# Patient Record
Sex: MALE | Race: WHITE | Employment: FULL TIME | URBAN - METROPOLITAN AREA
[De-identification: names, ages, dates, MRNs, and addresses within clinical notes are randomized per-mention and may not be internally consistent; named-entity substitution may affect disease eponyms.]

---

## 2017-03-04 ENCOUNTER — OFFICE VISIT (OUTPATIENT)
Dept: FAMILY MEDICINE CLINIC | Facility: CLINIC | Age: 47
End: 2017-03-04

## 2017-03-04 VITALS
HEART RATE: 80 BPM | TEMPERATURE: 99 F | WEIGHT: 295 LBS | BODY MASS INDEX: 50.36 KG/M2 | OXYGEN SATURATION: 90 % | HEIGHT: 64.25 IN | SYSTOLIC BLOOD PRESSURE: 130 MMHG | RESPIRATION RATE: 16 BRPM | DIASTOLIC BLOOD PRESSURE: 70 MMHG

## 2017-03-04 DIAGNOSIS — J22 ACUTE LOWER RESPIRATORY TRACT INFECTION: Primary | ICD-10-CM

## 2017-03-04 DIAGNOSIS — J98.01 COUGH DUE TO BRONCHOSPASM: ICD-10-CM

## 2017-03-04 DIAGNOSIS — J11.1 INFLUENZA-LIKE ILLNESS: ICD-10-CM

## 2017-03-04 PROCEDURE — 94640 AIRWAY INHALATION TREATMENT: CPT | Performed by: NURSE PRACTITIONER

## 2017-03-04 PROCEDURE — 99203 OFFICE O/P NEW LOW 30 MIN: CPT | Performed by: NURSE PRACTITIONER

## 2017-03-04 RX ORDER — PREDNISONE 20 MG/1
TABLET ORAL
Qty: 10 TABLET | Refills: 0 | Status: SHIPPED | OUTPATIENT
Start: 2017-03-04 | End: 2017-11-19

## 2017-03-04 RX ORDER — AZITHROMYCIN 250 MG/1
TABLET, FILM COATED ORAL
Qty: 6 TABLET | Refills: 0 | Status: SHIPPED | OUTPATIENT
Start: 2017-03-04 | End: 2017-11-19

## 2017-03-04 RX ORDER — IPRATROPIUM BROMIDE AND ALBUTEROL SULFATE 2.5; .5 MG/3ML; MG/3ML
3 SOLUTION RESPIRATORY (INHALATION) ONCE
Status: COMPLETED | OUTPATIENT
Start: 2017-03-04 | End: 2017-03-04

## 2017-03-04 RX ADMIN — IPRATROPIUM BROMIDE AND ALBUTEROL SULFATE 3 ML: 2.5; .5 SOLUTION RESPIRATORY (INHALATION) at 14:00:00

## 2017-03-04 NOTE — PROGRESS NOTES
CHIEF COMPLAINT:   Patient presents with:  Cough        HPI:   Cyndia Aase is a 42year old male who presents with wife for cough for  5  days. Pt reports flu like symptoms that started 4 days ago.   Reports had sudden onset of high tactile fev LUNGS: Normal respiratory rate. Normal effort. Dry cough. Expiratory wheezing throughout. No rales or crackles. Decreased air entry. O2 Sat 90%. Duo neb given; pt tolerated well. O2Sat increased to 98% after neb; HR 90.   Reports improvement in cough - Albuterol Sulfate (PROAIR RESPICLICK) 315 (90 Base) MCG/ACT Inhalation Aerosol Powder, Breath Activated; Inhale 2 puffs into the lungs every 4 to 6 hours as needed. Dispense: 1 each; Refill: 0    3.  Influenza-like illness  Outside of window of benefit f · Drink 6 to 8 glasses of fluids every day to make sure you are getting enough fluids. Beverages can include water, sport drinks, sodas without caffeine, juices, tea, or soup. Fluids will help loosen secretions in the lung.  This will make it easier for you

## 2017-03-04 NOTE — PATIENT INSTRUCTIONS
· Take azithromycin as prescribed  · Use Albuterol inhaler 2 puffs every 4-6 hours while awake for the next 48 hours, then every 4-6 hours as needed for cough spasms/difficulty breathing/wheezing/shortness of breath.     · Drink a lot of fluids; increase re Follow up with your health care provider in the next 2 to 3 days, or as advised. This is to be sure the medicine is helping you get better. If you are 72 or older, you should get a pneumococcal vaccine and a yearly flu (influenza) shot.  You should also ge

## 2017-08-05 ENCOUNTER — OFFICE VISIT (OUTPATIENT)
Dept: FAMILY MEDICINE CLINIC | Facility: CLINIC | Age: 47
End: 2017-08-05

## 2017-08-05 VITALS
DIASTOLIC BLOOD PRESSURE: 78 MMHG | RESPIRATION RATE: 20 BRPM | TEMPERATURE: 98 F | HEART RATE: 88 BPM | OXYGEN SATURATION: 96 % | SYSTOLIC BLOOD PRESSURE: 122 MMHG

## 2017-08-05 DIAGNOSIS — J40 BRONCHITIS: Primary | ICD-10-CM

## 2017-08-05 PROCEDURE — 99213 OFFICE O/P EST LOW 20 MIN: CPT | Performed by: NURSE PRACTITIONER

## 2017-08-05 RX ORDER — BENZONATATE 100 MG/1
100 CAPSULE ORAL 3 TIMES DAILY PRN
Qty: 30 CAPSULE | Refills: 0 | Status: SHIPPED | OUTPATIENT
Start: 2017-08-05 | End: 2017-11-19

## 2017-08-05 RX ORDER — PREDNISONE 20 MG/1
TABLET ORAL
Qty: 10 TABLET | Refills: 0 | Status: SHIPPED | OUTPATIENT
Start: 2017-08-05 | End: 2017-11-19

## 2017-11-19 ENCOUNTER — OFFICE VISIT (OUTPATIENT)
Dept: FAMILY MEDICINE CLINIC | Facility: CLINIC | Age: 47
End: 2017-11-19

## 2017-11-19 VITALS
SYSTOLIC BLOOD PRESSURE: 138 MMHG | HEART RATE: 78 BPM | HEIGHT: 64.5 IN | DIASTOLIC BLOOD PRESSURE: 72 MMHG | TEMPERATURE: 98 F | BODY MASS INDEX: 49.75 KG/M2 | WEIGHT: 295 LBS | RESPIRATION RATE: 14 BRPM | OXYGEN SATURATION: 98 %

## 2017-11-19 DIAGNOSIS — J00 ACUTE NASOPHARYNGITIS: Primary | ICD-10-CM

## 2017-11-19 PROCEDURE — 87081 CULTURE SCREEN ONLY: CPT | Performed by: PHYSICIAN ASSISTANT

## 2017-11-19 PROCEDURE — 99213 OFFICE O/P EST LOW 20 MIN: CPT | Performed by: PHYSICIAN ASSISTANT

## 2017-11-19 PROCEDURE — 87880 STREP A ASSAY W/OPTIC: CPT | Performed by: PHYSICIAN ASSISTANT

## 2017-11-19 RX ORDER — IPRATROPIUM BROMIDE 21 UG/1
2 SPRAY, METERED NASAL EVERY 12 HOURS
Qty: 1 BOTTLE | Refills: 0 | Status: SHIPPED | OUTPATIENT
Start: 2017-11-19 | End: 2020-02-15

## 2017-11-19 NOTE — PROGRESS NOTES
CHIEF COMPLAINT:   Patient presents with:  Sore Throat: since this morning, right sided nasal congestion      HPI:   Remedios Luna is a 41year old male who presents for upper respiratory symptoms for  since awakening 2 hour ago.  Patient reports LUNGS: clear to auscultation bilaterally, no wheezes or rhonchi. Breathing is non labored. CARDIO: RRR without murmur  EXTREMITIES: no cyanosis, clubbing or edema  LYMPH:  No cervical or submandibular lymphadenopathy.       Recent Results (from the past 24 What are the symptoms of a cold virus? Almost all colds involve a stuffy nose. Other common symptoms include:  · Runny nose  · Sneezing  · Sore throat  · Headache  · Cough  How is a cold treated? Colds usually last 5 to 10 days.  Treatment focuses on reli Colds usually go away by themselves. But it’s not unusual to get another type of infection while you have a cold.  These can include:  · Sinus infection  · Lung infection, such as bronchitis or pneumonia  · Ear infection  If you have asthma or chronic bronc

## 2019-11-07 NOTE — PROGRESS NOTES
Dyana Wilkinson CHIEF COMPLAINT:   Patient presents with:  Cough: for 4 days        HPI:   Kellee Valera is a 41year old male who presents for cough for  4  days. Cough started gradually and is described as tight and deep. Patient has history of bronchitis. CARDIOVASCULAR: Denies chest pain or palpitations  LUNGS: Per HPI. GI: Denies N/V/C/D or abdominal pain.       EXAM:   /78 (BP Location: Left arm, Patient Position: Sitting, Cuff Size: large)   Pulse 88   Temp 98 °F (36.7 °C) (Oral)   Resp 20   SpO · If your symptoms worsen, you become short-of-breath, develop fever, worse cough etc, you must follow-up with a physician immediately or go to the emergency room. Increase fluids, rest.  Reviewed meds and instructions as below with patient.   Ris Inflamed airway: Inflammation and extra mucus narrow the airway, causing shortness of breath. Impaired cilia: Extra mucus impairs cilia, causing congestion and wheezing. Smoking makes the problem worse.    Making a diagnosis  A physical exam, health hi · Take the medicine until it is used up, even if symptoms have improved. If you don’t, the bronchitis may come back. · Take them as directed. For instance, some medicines should be taken with food.   · Ask your provider or pharmacist what side effects are [Alert] : alert [No Acute Distress] : no acute distress [Normal Sclera/Conjunctiva] : normal sclera/conjunctiva [Supple] : the neck was supple [Clear to Auscultation] : lungs were clear to auscultation bilaterally [Normal Rate] : heart rate was normal  [Normal S1, S2] : normal S1 and S2 [No Edema] : there was no peripheral edema [Not Tender] : non-tender [Soft] : abdomen soft [No Stigmata of Cushings Syndrome] : no stigmata of cushings syndrome [No Rash] : no rash [Right Foot Was Examined] : right foot ~C was examined [Left Foot Was Examined] : left foot ~C was examined [Full ROM] : with full range of motion [No Tremors] : no tremors [Oriented x3] : oriented to person, place, and time [Normal Insight/Judgement] : insight and judgment were intact [Normal Affect] : the affect was normal [Normal Mood] : the mood was normal [Well Nourished] : well nourished [Well Developed] : well developed [Normal Hearing] : hearing was normal [Normal Oropharynx] : the oropharynx was normal [Thyroid Not Enlarged] : the thyroid was not enlarged [No Respiratory Distress] : no respiratory distress [Normal Rate and Effort] : normal respiratory rhythm and effort [No Accessory Muscle Use] : no accessory muscle use [Not Distended] : not distended [Post Cervical Nodes] : posterior cervical nodes [Anterior Cervical Nodes] : anterior cervical nodes [Normal] : normal and non tender [Normal Gait] : normal gait [2+] : 2+ in the dorsalis pedis [Diminished Throughout Both Feet] : normal tactile sensation with monofilament testing throughout both feet

## 2020-02-15 ENCOUNTER — OFFICE VISIT (OUTPATIENT)
Dept: FAMILY MEDICINE CLINIC | Facility: CLINIC | Age: 50
End: 2020-02-15
Payer: COMMERCIAL

## 2020-02-15 VITALS
WEIGHT: 305 LBS | HEIGHT: 64 IN | OXYGEN SATURATION: 92 % | SYSTOLIC BLOOD PRESSURE: 143 MMHG | BODY MASS INDEX: 52.07 KG/M2 | RESPIRATION RATE: 22 BRPM | DIASTOLIC BLOOD PRESSURE: 73 MMHG | TEMPERATURE: 99 F | HEART RATE: 78 BPM

## 2020-02-15 DIAGNOSIS — E66.01 MORBIDLY OBESE (HCC): ICD-10-CM

## 2020-02-15 DIAGNOSIS — J22 LOWER RESPIRATORY INFECTION: ICD-10-CM

## 2020-02-15 DIAGNOSIS — J98.01 ACUTE BRONCHOSPASM: Primary | ICD-10-CM

## 2020-02-15 PROCEDURE — 99213 OFFICE O/P EST LOW 20 MIN: CPT | Performed by: NURSE PRACTITIONER

## 2020-02-15 PROCEDURE — 94640 AIRWAY INHALATION TREATMENT: CPT | Performed by: NURSE PRACTITIONER

## 2020-02-15 RX ORDER — IPRATROPIUM BROMIDE AND ALBUTEROL SULFATE 2.5; .5 MG/3ML; MG/3ML
3 SOLUTION RESPIRATORY (INHALATION) ONCE
Status: COMPLETED | OUTPATIENT
Start: 2020-02-15 | End: 2020-02-15

## 2020-02-15 RX ORDER — PREDNISONE 20 MG/1
40 TABLET ORAL DAILY
Qty: 10 TABLET | Refills: 0 | Status: SHIPPED | OUTPATIENT
Start: 2020-02-15 | End: 2020-02-20

## 2020-02-15 RX ORDER — AZITHROMYCIN 250 MG/1
TABLET, FILM COATED ORAL
Qty: 6 TABLET | Refills: 0 | Status: SHIPPED | OUTPATIENT
Start: 2020-02-15

## 2020-02-15 RX ADMIN — IPRATROPIUM BROMIDE AND ALBUTEROL SULFATE 3 ML: 2.5; .5 SOLUTION RESPIRATORY (INHALATION) at 12:00:00

## 2020-02-15 NOTE — PATIENT INSTRUCTIONS
Bronchospasm (Adult)    Bronchospasm occurs when the airways (bronchial tubes) go into spasm and contract. This makes it hard to breathe and causes wheezing (a high-pitched whistling sound). Bronchospasm can also cause frequent coughing without wheezing. If you are age 72 or older, have a chronic lung disease or condition that affects your immune system, or you smoke, ask your healthcare provider about getting a pneumococcal vaccine, as well as a yearly flu shot (influenza vaccine).   When to seek medical a · Your body’s response may be to cough. This can help clear out the fluid. · The fluid (mucus) you cough up may appear green or dark yellow. · The excess mucus may make you feel short of breath. · The inflammation and infection may give you a fever.

## 2020-02-15 NOTE — PROGRESS NOTES
CHIEF COMPLAINT:   Patient presents with:  Cough        HPI:   Heather Rogers is a 54year old male who presents for shortness of breath, cough, chills, and sore throat that started 2-3 days ago.  Reports shortness of breath worse last night and th HENT: Atraumatic, normocephalic. TM's clear bilaterally. Nostrils patent, nasal mucosa pink and non-inflamed. No erythema of the throat. NECK: supple, non-tender. LUNGS: Rhonchi throughout. No crackles. R-22. Breathing is slightly labored.    CARDIO: RR Bronchospasm is due to irritation, inflammation, or allergic reaction of the airways. People with asthma get bronchospasm. However, not everyone with bronchospasm has asthma.   Being exposed to harmful fumes, a recent case of bronchitis, exercise, or a flar · Cough that brings up lots of dark-colored sputum (mucus)  · You don't get better within 24 hours  Call 911  Call 911 if any of these occur:  · Coughing up bloody sputum (mucus)  · Chest pain with each breath  · Increased wheezing or shortness of breath Symptoms of pneumonia can come without warning. At first, you may think you have a cold or flu. But symptoms may get worse quickly, turning into pneumonia. Symptoms can be different for bacterial and viral pneumonia.  Common symptoms may include:  · Severe

## (undated) NOTE — LETTER
Date: 2/15/2020    Patient Name: Remedios Luna          To Whom it may concern: This letter has been written at the patient's request. The above patient was seen at the [facility] for treatment of a medical condition.     Patient

## (undated) NOTE — MR AVS SNAPSHOT
[address] [place] [address] [place]  [phone]               Thank you for choosing us for your health care visit with CHEPE Turner. We are glad to serve you and happy to provide you with this summary of your visit. disease, talk with your health care provider before using these medicines. Also talk with your provider if Manualanista Courser had a stomach ulcer or GI bleeding. Don’t give aspirin to anyone younger than 25years of age who is ill with a fever.  It may cause severe jay substitute for professional medical care. Always follow your healthcare professional's instructions.              Allergies as of Mar 04, 2017     No Known Allergies                Today's Vital Signs     BP Pulse Temp Height Weight BMI    130/70 mmHg 80 98 If you have questions, you can call [phone] to talk to our [facility] Staff. Remember, [employer] is NOT to be used for urgent needs. For medical emergencies, dial 911.         Educational Information     Healthy Diet and Regular Exercise  [facility]